# Patient Record
Sex: FEMALE | URBAN - METROPOLITAN AREA
[De-identification: names, ages, dates, MRNs, and addresses within clinical notes are randomized per-mention and may not be internally consistent; named-entity substitution may affect disease eponyms.]

---

## 2023-01-30 ENCOUNTER — OFFICE VISIT (OUTPATIENT)
Dept: URBAN - METROPOLITAN AREA CLINIC 3 | Facility: CLINIC | Age: 68
End: 2023-01-30
Payer: MEDICARE

## 2023-01-30 DIAGNOSIS — H44.23 DEGENERATIVE MYOPIA, BILATERAL: ICD-10-CM

## 2023-01-30 DIAGNOSIS — H25.813 COMBINED FORMS OF AGE-RELATED CATARACT, BILATERAL: ICD-10-CM

## 2023-01-30 DIAGNOSIS — H16.309: Primary | ICD-10-CM

## 2023-01-30 DIAGNOSIS — H43.813 VITREOUS DEGENERATION, BILATERAL: ICD-10-CM

## 2023-01-30 PROCEDURE — 92250 FUNDUS PHOTOGRAPHY W/I&R: CPT | Performed by: OPTOMETRIST

## 2023-01-30 PROCEDURE — 99204 OFFICE O/P NEW MOD 45 MIN: CPT | Performed by: OPTOMETRIST

## 2023-01-30 ASSESSMENT — INTRAOCULAR PRESSURE
OS: 12
OD: 14

## 2023-01-30 NOTE — IMPRESSION/PLAN
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 Refer to Dr. Les Tucker for examination prior to cataract surgery for evaluation. Photos taken.

## 2023-01-30 NOTE — IMPRESSION/PLAN
Impression: Degenerative myopia, bilateral: H44.23. Plan: Fundus photo taken.  Degenerative myopia both eyes - Risk of retina detachment discussed with patient and advised patient to call immediately if experiences flashes of light, floaters, changes of peripheral vision, etc.

## 2023-01-30 NOTE — IMPRESSION/PLAN
Impression: Combined forms of age-related cataract, bilateral: H25.813. Plan: Plan to perform cataract surgery in both eyes with the LEFT  eye first: Discussed the risks, benefits, and alternatives of cataract surgery. The patient stated a full understanding and a desire to proceed with the procedure. Advised against driving until complete. Reviewed the increased risk of retinal detachment after cataract surgery and reviewed retinal detachment and general warnings and to contact us immediately should any of those develop. Cataracts affecting driving at night due to increased glare/starbursts from oncoming traffic. Patient cannot see television, signs and labels as a result of the cataracts. 14 incision RK OD and 12 OS with bilateral crystalline keratopathy and possible ectasia. Patient is candidate/interested standard lens,  ORA and laser assisted cataract surgery.

## 2023-02-20 ENCOUNTER — OFFICE VISIT (OUTPATIENT)
Dept: URBAN - METROPOLITAN AREA CLINIC 6 | Facility: CLINIC | Age: 68
End: 2023-02-20
Payer: MEDICARE

## 2023-02-20 DIAGNOSIS — H16.309: Primary | ICD-10-CM

## 2023-02-20 PROCEDURE — 92002 INTRM OPH EXAM NEW PATIENT: CPT | Performed by: OPHTHALMOLOGY

## 2023-02-20 ASSESSMENT — INTRAOCULAR PRESSURE
OS: 11
OD: 13

## 2023-02-20 NOTE — IMPRESSION/PLAN
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 Start vancomycin 10 mg/ml gtts q 2hr WA ou. Defer any surfery for now till infection resolves. RTC1 week, sooner prn.

## 2023-03-01 ENCOUNTER — OFFICE VISIT (OUTPATIENT)
Dept: URBAN - METROPOLITAN AREA CLINIC 6 | Facility: CLINIC | Age: 68
End: 2023-03-01
Payer: MEDICARE

## 2023-03-01 DIAGNOSIS — H16.309: Primary | ICD-10-CM

## 2023-03-01 PROCEDURE — 92012 INTRM OPH EXAM EST PATIENT: CPT | Performed by: OPHTHALMOLOGY

## 2023-03-01 ASSESSMENT — INTRAOCULAR PRESSURE
OS: 8
OD: 10

## 2023-03-01 NOTE — IMPRESSION/PLAN
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 Long standing but Improving. Taper  vancomycin 10 mg/ml gtts QID ou. Defer any surgery for now till infection resolves. RTC 1 month. sooner prn.

## 2023-04-03 ENCOUNTER — OFFICE VISIT (OUTPATIENT)
Dept: URBAN - METROPOLITAN AREA CLINIC 3 | Facility: CLINIC | Age: 68
End: 2023-04-03
Payer: MEDICARE

## 2023-04-03 DIAGNOSIS — H16.309: Primary | ICD-10-CM

## 2023-04-03 PROCEDURE — 92285 EXTERNAL OCULAR PHOTOGRAPHY: CPT | Performed by: OPTOMETRIST

## 2023-04-03 PROCEDURE — 99212 OFFICE O/P EST SF 10 MIN: CPT | Performed by: OPTOMETRIST

## 2023-04-03 ASSESSMENT — INTRAOCULAR PRESSURE
OD: 9
OS: 9

## 2023-04-03 NOTE — IMPRESSION/PLAN
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 Longstanding. Finished vancomycin 10 mg/ml as prescribed.